# Patient Record
Sex: MALE | Race: OTHER
[De-identification: names, ages, dates, MRNs, and addresses within clinical notes are randomized per-mention and may not be internally consistent; named-entity substitution may affect disease eponyms.]

---

## 2018-07-24 ENCOUNTER — HOSPITAL ENCOUNTER (EMERGENCY)
Dept: HOSPITAL 80 - FED | Age: 28
Discharge: HOME | End: 2018-07-24
Payer: COMMERCIAL

## 2018-07-24 VITALS — SYSTOLIC BLOOD PRESSURE: 145 MMHG | DIASTOLIC BLOOD PRESSURE: 101 MMHG
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
## 2018-07-24 DIAGNOSIS — Z23: ICD-10-CM
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
## 2018-07-24 DIAGNOSIS — T20.17XA: Primary | ICD-10-CM

## 2018-07-24 DIAGNOSIS — T31.0: ICD-10-CM

## 2018-07-24 DIAGNOSIS — L89.811: ICD-10-CM

## 2018-07-24 NOTE — EDPHY
H & P


Stated Complaint: HIT IN NECK WHEN PIPE EXPLODED


Time Seen by Provider: 07/24/18 16:00


HPI/ROS: 


HPI:  This is a 27-year-old male who presents with





Chief Complaint: HIT IN NECK WHEN PIPE EXPLODED





Location:  Left lateral neck 


Quality:  Injury


Duration:  1-2 hours prior to arrival


Signs and Symptoms:  No bleeding, no radiation, no numbness, no weakness, no 

tingling, no incontinence, no decreased range of motion, + swelling, + pain, no 

fever


Timing:  Acute


Severity:  Moderate


Context:  Patient presents from work with a work related injury related 100-150 

psi pipe breaking above his head and the hot air hitting his left lateral neck 

and jawline.  Patient reports that the pressure immediately took his breath 

away and he had difficulty swallowing but the symptoms have quickly resolved 

over approximately 15-30 minutes.  He complains of a burning, constant, 

moderate pain in his left lateral neck.  Denies LOC/head injury/neck pain/

dizziness/nausea/vomiting/amnesia.  Tetanus not up-to-date. 


Modifying Factors:  He is not clean the area or applied ice





Comment: 








ROS: see HPI


Constitutional: No fever, no chills, no weight loss


Eyes:  No blurred vision


Respiratory:  No shortness of breath, no cough


Cardiovascular:  No chest pain


Gastrointestinal:  No nausea, no vomiting no diarrhea 


Genitourinary:  No dysuria 


Extremities:  No myalgias 


Neurologic:  No weakness, no numbness


Skin:  No rashes


Hematologic:  No bruising, no bleeding





MEDICAL/SURGICAL/SOCIAL HISTORY: 


Medical history:  Generally healthy.  Does not take any regular medications.


Surgical history:  Denies


Social history:  Never smoked.  Employed.








CONSTITUTIONAL:  Extremely polite and cooperative  adult male, awake 

and alert, no obvious distress


HEENT: Atraumatic and normocephalic.


NECK: supple, erythematous blackened area and the neck crease in the left 

lateral side wrapping around from the esophagus to the midline portions; no 

blisters or breaks in the skin.  There is some small black tiny particles that 

may be metal related or just dust that are easily script away from the surface 

of the skin.  Area is extremely tender to palpation.  no midline tenderness, 

flexion 45 degrees, extension 45 degrees, right and left lateral flexion 45 

degrees. No meningismus.


Cardiovascular: Normal S1/S2, regular rate, regular rhythm, without murmur rub 

or gallop.


PULMONARY/CHEST:  Symmetrical and nontender. no crepitus. Clear to auscultation 

bilaterally. Good air movement. No accessory muscle usage.


ABDOMEN:  Soft, nondistended, nontender, no ecchymosis.


EXTREMITIES:  2/2 pulses, strength 5/5, good light touch sensation. no 

deformities, no clubbing, no cyanosis or edema.


NEUROLOGICAL: no focal neuro deficits.  GCS 15.  Light touch sensation intact.


SKIN: Warm and dry, no erythema. no rash.  Good capillary refill. 


  





Source: Patient


Exam Limitations: No limitations





- Personal History


Current Tetanus/Diphtheria Vaccine: No


Current Tetanus Diphtheria and Acellular Pertussis (TDAP): No





- Medical/Surgical History


Hx Asthma: No


Hx Chronic Respiratory Disease: No


Hx Diabetes: No


Hx Cardiac Disease: No


Hx Renal Disease: No


Hx Cirrhosis: No


Hx Alcoholism: No


Hx HIV/AIDS: No


Hx Splenectomy or Spleen Trauma: No


Other PMH: DENIES





- Social History


Smoking Status: Never smoked


Constitutional: 


 Initial Vital Signs











Temperature (C)  36.4 C   07/24/18 15:27


 


Heart Rate  85   07/24/18 15:27


 


Respiratory Rate  18   07/24/18 15:27


 


Blood Pressure  157/91 H  07/24/18 15:27


 


O2 Sat (%)  95   07/24/18 15:27








 











O2 Delivery Mode               Room Air














Allergies/Adverse Reactions: 


 





No Known Allergies Allergy (Unverified 07/24/18 15:29)


 








Home Medications: 














 Medication  Instructions  Recorded


 


NK [No Known Home Meds]  07/24/18














Medical Decision Making


ED Course/Re-evaluation: 


Vital signs reviewed and stable upon arrival. 


Copiously irrigated with soap and water;bacitracin; clean sterile dressing 

applied.


Appears to have 1st degree pressure cause/contact burn.


I-STAT and CT soft tissue of the neck with contrast ordered to further evaluate 

for deep space involvement.


Given ibuprofen 100 mg and Percocet x1. 


Tetanus booster given.


1634:  Notified by tech that creatinine 1.0; okay for CT of neck


Called by Radiology, Dr. Callejas, who advises that CT soft tissue of the neck 

shows no sub acute emphysema, edema, foreign bodies. 


Verbal and written wound care instructions provided to patient. 








No signs of neurovascular compromise/tenting of skin/compartment syndrome/

extremities and joints examined above and below area of concern and are 

neurovascularly intact/respiratory compromise/esophageal injury. 








This patient was seen under the supervision of my secondary supervising 

physician.  I evaluated care for this patient independently.  Discussed this 

patient with Dr. Bhatt.  





Differential Diagnosis: 


Differential diagnosis includes but is not limited to 1st degree burn, second-

degree burn, third-degree burn, esophageal injury, deep space injury.  








- Data Points


Laboratory Results: 


 











  07/24/18





  16:30


 


POC Hgb  15.0 gm/dL gm/dL





   (13.7-17.5) 


 


POC Hct  44 % %





   (40-51) 


 


POC Sodium  143 mEq/L mEq/L





   (135-145) 


 


POC Potassium  3.8 mEq/L mEq/L





   (3.3-5.0) 


 


POC Chloride  104 mEq/L mEq/L





   () 


 


POC BUN  20 mg/dL mg/dL





   (7-23) 


 


POC Creatinine  1.0 mg/dL mg/dL





   (0.7-1.3) 


 


POC Glucose  97 mg/dL mg/dL





   () 











Medications Given: 


 








Discontinued Medications





Diphtheria/Tetanus/Acell Pertussis (Boostrix)  0.5 ml IM .ONCE ONE


   Stop: 07/24/18 16:02


   Last Admin: 07/24/18 16:24 Dose:  0.5 ml


Ibuprofen (Motrin)  800 mg PO EDNOW ONE


   Stop: 07/24/18 16:05


   Last Admin: 07/24/18 16:23 Dose:  800 mg


Oxycodone/Acetaminophen (Percocet 5/325)  1 tab PO EDNOW ONE


   Stop: 07/24/18 16:05


   Last Admin: 07/24/18 16:24 Dose:  1 tab


Tetracaine/Epinephrine/Lidocaine (Let Gel Topical)  1 ea TP EDNOW ONE


   Stop: 07/24/18 16:05


   Last Admin: 07/24/18 16:53 Dose:  1 ea





Point of Care Test Results: 


 Chemistry











  07/24/18





  16:30


 


POC Sodium  143 mEq/L mEq/L





   (135-145) 


 


POC Potassium  3.8 mEq/L mEq/L





   (3.3-5.0) 


 


POC Chloride  104 mEq/L mEq/L





   () 


 


POC BUN  20 mg/dL mg/dL





   (7-23) 


 


POC Creatinine  1.0 mg/dL mg/dL





   (0.7-1.3) 


 


POC Glucose  97 mg/dL mg/dL





   () 








 ISTAT H&H











  07/24/18





  16:30


 


POC Hgb  15.0 gm/dL gm/dL





   (13.7-17.5) 


 


POC Hct  44 % %





   (40-51) 














Departure





- Departure


Disposition: Home, Routine, Self-Care


Clinical Impression: 


 First degree burn injury





Pressure injury of skin of head


Qualifiers:


 Pressure injury stage: stage 1 Qualified Code(s): L89.811 - Pressure ulcer of 

head, stage 1





Condition: Good


Instructions:  Sunburn (ED), Cold Compress or Soak (ED)


Additional Instructions: 


Keep the dressing dry and in place for 48 hours.


After 48 hours, you may remove the dressing; wash the site daily with mild soap 

and water; then pat dry; apply over-the-counter antibiotic ointment daily and 

keep covered until fully healed.


Take Tylenol 650 mg every 4 hours and/or Ibuprofen 600 mg every 8 hours with 

food as needed for pain. 


Apply ice or cool compresses for 30 minutes at a time; 2-3 times per day for 

the next 1-2 days. 








Return to the ER immediately if you experience redness, red streaks, have fevers

/chills, flu like symptoms, limited range of motion, or any other symptoms that 

concern you.





Referrals: 


[facility],. [Clinic] - As per Instructions